# Patient Record
Sex: FEMALE | URBAN - METROPOLITAN AREA
[De-identification: names, ages, dates, MRNs, and addresses within clinical notes are randomized per-mention and may not be internally consistent; named-entity substitution may affect disease eponyms.]

---

## 2020-11-20 ENCOUNTER — IMPORTED ENCOUNTER (OUTPATIENT)
Dept: URBAN - METROPOLITAN AREA CLINIC 50 | Facility: CLINIC | Age: 70
End: 2020-11-20

## 2020-11-20 NOTE — PATIENT DISCUSSION
"""Informed patient that their cataract is visually significant and meets the criteria for cataract "" Alert and oriented to person, place and time

## 2020-11-24 ENCOUNTER — IMPORTED ENCOUNTER (OUTPATIENT)
Dept: URBAN - METROPOLITAN AREA CLINIC 50 | Facility: CLINIC | Age: 70
End: 2020-11-24

## 2020-12-31 ENCOUNTER — IMPORTED ENCOUNTER (OUTPATIENT)
Dept: URBAN - METROPOLITAN AREA CLINIC 50 | Facility: CLINIC | Age: 70
End: 2020-12-31

## 2021-01-05 ENCOUNTER — IMPORTED ENCOUNTER (OUTPATIENT)
Dept: URBAN - METROPOLITAN AREA CLINIC 50 | Facility: CLINIC | Age: 71
End: 2021-01-05

## 2021-01-12 ENCOUNTER — IMPORTED ENCOUNTER (OUTPATIENT)
Dept: URBAN - METROPOLITAN AREA CLINIC 50 | Facility: CLINIC | Age: 71
End: 2021-01-12

## 2021-01-14 ENCOUNTER — IMPORTED ENCOUNTER (OUTPATIENT)
Dept: URBAN - METROPOLITAN AREA CLINIC 50 | Facility: CLINIC | Age: 71
End: 2021-01-14

## 2021-01-14 NOTE — PATIENT DISCUSSION
"""Phaco with IOL OS: 01/19/2021 Sensar AAB00 +18.50 Target: [facility] [place]

## 2021-01-14 NOTE — PATIENT DISCUSSION
"""S/P IOL OD: Sensar AAB00 +18.50 +Femto/Arcs +Omidria. Continue post operative instructions and drops per schedule.  """

## 2021-01-19 ENCOUNTER — IMPORTED ENCOUNTER (OUTPATIENT)
Dept: URBAN - METROPOLITAN AREA CLINIC 50 | Facility: CLINIC | Age: 71
End: 2021-01-19

## 2021-01-19 NOTE — PATIENT DISCUSSION
"""S/P IOL OS: Sensar AAB00 +18.50 (Target: [place]) +Femto/Arcs +Omidria. Continue post operative instructions and drops per schedule.  """

## 2021-02-01 ENCOUNTER — IMPORTED ENCOUNTER (OUTPATIENT)
Dept: URBAN - METROPOLITAN AREA CLINIC 50 | Facility: CLINIC | Age: 71
End: 2021-02-01

## 2021-02-18 ENCOUNTER — IMPORTED ENCOUNTER (OUTPATIENT)
Dept: URBAN - METROPOLITAN AREA CLINIC 50 | Facility: CLINIC | Age: 71
End: 2021-02-18

## 2021-02-18 NOTE — PATIENT DISCUSSION
"""S/P IOL OU: OD: Sensar AAB00 +18. 50Femto/ArcsOmidria. OS: Sensar AAB00 +18.50 (Target: [place])/Arcs. "

## 2021-04-23 ASSESSMENT — VISUAL ACUITY
OD_SC: 20/20
OS_SC: 20/30+
OD_OTHER: 20/50. 20/50.
OD_BAT: 20/50
OS_BAT: 20/40
OD_BAT: 20/50
OS_CC: 20/50
OS_SC: 20/20-
OD_CC: 20/30+
OD_CC: 20/40=
OS_OTHER: 20/40. 20/40.
OD_SC: 20/20
OS_OTHER: 20/30. 20/50.
OS_BAT: 20/30
OD_SC: 20/20-1
OS_OTHER: 20/40. 20/50.
OS_BAT: 20/40
OS_OTHER: 20/40. 20/40.
OD_SC: 20/100
OS_CC: J1+
OD_BAT: 20/25
OS_BAT: 20/40
OS_CC: 20/30+
OS_SC: 20/20
OD_OTHER: 20/50. 20/50.
OS_CC: 20/30+
OD_CC: J1+
OD_OTHER: 20/25. 20/40.

## 2021-04-23 ASSESSMENT — TONOMETRY
OS_IOP_MMHG: 13
OS_IOP_MMHG: 12
OS_IOP_MMHG: 15
OD_IOP_MMHG: 14
OD_IOP_MMHG: 15
OS_IOP_MMHG: 19
OD_IOP_MMHG: 16
OS_IOP_MMHG: 13
OD_IOP_MMHG: 13
OD_IOP_MMHG: 19
OD_IOP_MMHG: 12
OS_IOP_MMHG: 16